# Patient Record
Sex: MALE | Race: WHITE | NOT HISPANIC OR LATINO | Employment: UNEMPLOYED | URBAN - METROPOLITAN AREA
[De-identification: names, ages, dates, MRNs, and addresses within clinical notes are randomized per-mention and may not be internally consistent; named-entity substitution may affect disease eponyms.]

---

## 2022-03-14 ENCOUNTER — HOSPITAL ENCOUNTER (INPATIENT)
Facility: CLINIC | Age: 37
LOS: 3 days | Discharge: HOME | End: 2022-03-18
Attending: EMERGENCY MEDICINE | Admitting: PSYCHIATRY & NEUROLOGY
Payer: COMMERCIAL

## 2022-03-14 ENCOUNTER — TELEPHONE (OUTPATIENT)
Dept: BEHAVIORAL HEALTH | Facility: CLINIC | Age: 37
End: 2022-03-14

## 2022-03-14 ENCOUNTER — APPOINTMENT (OUTPATIENT)
Dept: CT IMAGING | Facility: CLINIC | Age: 37
End: 2022-03-14
Attending: EMERGENCY MEDICINE
Payer: COMMERCIAL

## 2022-03-14 DIAGNOSIS — F10.10 ALCOHOL ABUSE: ICD-10-CM

## 2022-03-14 DIAGNOSIS — F31.32 BIPOLAR AFFECTIVE DISORDER, CURRENTLY DEPRESSED, MODERATE (H): ICD-10-CM
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
## 2022-03-14 DIAGNOSIS — R45.851 SUICIDAL IDEATION: ICD-10-CM

## 2022-03-14 DIAGNOSIS — S01.01XA LACERATION OF SCALP, INITIAL ENCOUNTER: ICD-10-CM

## 2022-03-14 DIAGNOSIS — G47.09 OTHER INSOMNIA: ICD-10-CM

## 2022-03-14 DIAGNOSIS — F10.120 ALCOHOL ABUSE WITH UNCOMPLICATED INTOXICATION (H): ICD-10-CM

## 2022-03-14 DIAGNOSIS — M10.9 ACUTE GOUTY ARTHRITIS: Primary | ICD-10-CM

## 2022-03-14 DIAGNOSIS — Z11.52 ENCOUNTER FOR SCREENING LABORATORY TESTING FOR SEVERE ACUTE RESPIRATORY SYNDROME CORONAVIRUS 2 (SARS-COV-2): ICD-10-CM

## 2022-03-14 LAB
ALCOHOL BREATH TEST: 0.19 (ref 0–0.01)
SARS-COV-2 RNA RESP QL NAA+PROBE: NEGATIVE

## 2022-03-14 PROCEDURE — 250N000013 HC RX MED GY IP 250 OP 250 PS 637: Performed by: EMERGENCY MEDICINE

## 2022-03-14 PROCEDURE — 70450 CT HEAD/BRAIN W/O DYE: CPT

## 2022-03-14 PROCEDURE — C9803 HOPD COVID-19 SPEC COLLECT: HCPCS | Performed by: EMERGENCY MEDICINE

## 2022-03-14 PROCEDURE — U0005 INFEC AGEN DETEC AMPLI PROBE: HCPCS | Performed by: EMERGENCY MEDICINE

## 2022-03-14 PROCEDURE — 99285 EMERGENCY DEPT VISIT HI MDM: CPT | Mod: 25 | Performed by: EMERGENCY MEDICINE

## 2022-03-14 PROCEDURE — 90791 PSYCH DIAGNOSTIC EVALUATION: CPT

## 2022-03-14 PROCEDURE — 82075 ASSAY OF BREATH ETHANOL: CPT | Performed by: EMERGENCY MEDICINE

## 2022-03-14 PROCEDURE — 99285 EMERGENCY DEPT VISIT HI MDM: CPT | Performed by: EMERGENCY MEDICINE

## 2022-03-14 RX ORDER — IBUPROFEN 600 MG/1
600 TABLET, FILM COATED ORAL ONCE
Status: COMPLETED | OUTPATIENT
Start: 2022-03-14 | End: 2022-03-14

## 2022-03-14 RX ORDER — ALLOPURINOL 100 MG/1
100 TABLET ORAL DAILY
Status: DISCONTINUED | OUTPATIENT
Start: 2022-03-14 | End: 2022-03-18

## 2022-03-14 RX ORDER — BUPROPION HYDROCHLORIDE 75 MG/1
50 TABLET ORAL 2 TIMES DAILY
COMMUNITY
End: 2022-03-14

## 2022-03-14 RX ORDER — BUPROPION HYDROCHLORIDE 150 MG/1
150 TABLET, EXTENDED RELEASE ORAL 2 TIMES DAILY
Status: DISCONTINUED | OUTPATIENT
Start: 2022-03-14 | End: 2022-03-18 | Stop reason: HOSPADM

## 2022-03-14 RX ORDER — FOLIC ACID 1 MG/1
1 TABLET ORAL DAILY
Status: DISCONTINUED | OUTPATIENT
Start: 2022-03-14 | End: 2022-03-15

## 2022-03-14 RX ORDER — BUPROPION HYDROCHLORIDE 150 MG/1
150 TABLET, EXTENDED RELEASE ORAL 2 TIMES DAILY
Status: ON HOLD | COMMUNITY
End: 2022-03-18

## 2022-03-14 RX ORDER — CLONIDINE HYDROCHLORIDE 0.1 MG/1
0.1 TABLET ORAL 2 TIMES DAILY
Status: ON HOLD | COMMUNITY
End: 2022-03-18

## 2022-03-14 RX ORDER — DIAZEPAM 5 MG
5-20 TABLET ORAL EVERY 30 MIN PRN
Status: DISCONTINUED | OUTPATIENT
Start: 2022-03-14 | End: 2022-03-15

## 2022-03-14 RX ORDER — DIVALPROEX SODIUM 500 MG/1
1500 TABLET, DELAYED RELEASE ORAL AT BEDTIME
Status: DISCONTINUED | OUTPATIENT
Start: 2022-03-14 | End: 2022-03-16

## 2022-03-14 RX ORDER — TRAZODONE HYDROCHLORIDE 50 MG/1
50 TABLET, FILM COATED ORAL AT BEDTIME
Status: ON HOLD | COMMUNITY
End: 2022-03-18

## 2022-03-14 RX ORDER — TRAZODONE HYDROCHLORIDE 50 MG/1
50 TABLET, FILM COATED ORAL AT BEDTIME
Status: DISCONTINUED | OUTPATIENT
Start: 2022-03-14 | End: 2022-03-18 | Stop reason: HOSPADM

## 2022-03-14 RX ORDER — LORAZEPAM 0.5 MG/1
0.5 TABLET ORAL 2 TIMES DAILY PRN
COMMUNITY
End: 2022-03-14

## 2022-03-14 RX ORDER — ALLOPURINOL 100 MG/1
100 TABLET ORAL DAILY
Status: ON HOLD | COMMUNITY
End: 2022-03-18

## 2022-03-14 RX ORDER — LORAZEPAM 0.5 MG/1
0.5 TABLET ORAL DAILY PRN
COMMUNITY

## 2022-03-14 RX ORDER — IBUPROFEN 600 MG/1
600 TABLET, FILM COATED ORAL EVERY 6 HOURS PRN
Status: DISCONTINUED | OUTPATIENT
Start: 2022-03-14 | End: 2022-03-18 | Stop reason: HOSPADM

## 2022-03-14 RX ORDER — CLONIDINE HYDROCHLORIDE 0.1 MG/1
0.1 TABLET ORAL 2 TIMES DAILY
Status: DISCONTINUED | OUTPATIENT
Start: 2022-03-14 | End: 2022-03-18 | Stop reason: HOSPADM

## 2022-03-14 RX ORDER — ACETAMINOPHEN 325 MG/1
650 TABLET ORAL EVERY 4 HOURS PRN
Status: DISCONTINUED | OUTPATIENT
Start: 2022-03-14 | End: 2022-03-15

## 2022-03-14 RX ORDER — DIVALPROEX SODIUM 500 MG/1
1500 TABLET, DELAYED RELEASE ORAL AT BEDTIME
Status: ON HOLD | COMMUNITY
End: 2022-03-18

## 2022-03-14 RX ORDER — MULTIPLE VITAMINS W/ MINERALS TAB 9MG-400MCG
1 TAB ORAL DAILY
Status: DISCONTINUED | OUTPATIENT
Start: 2022-03-14 | End: 2022-03-15

## 2022-03-14 RX ADMIN — DIAZEPAM 10 MG: 5 TABLET ORAL at 13:33

## 2022-03-14 RX ADMIN — CLONIDINE HYDROCHLORIDE 0.1 MG: 0.1 TABLET ORAL at 19:53

## 2022-03-14 RX ADMIN — DIAZEPAM 10 MG: 5 TABLET ORAL at 19:03

## 2022-03-14 RX ADMIN — BUPROPION HYDROCHLORIDE 150 MG: 150 TABLET, EXTENDED RELEASE ORAL at 20:20

## 2022-03-14 RX ADMIN — THIAMINE HCL TAB 100 MG 100 MG: 100 TAB at 13:33

## 2022-03-14 RX ADMIN — DIAZEPAM 5 MG: 5 TABLET ORAL at 21:03

## 2022-03-14 RX ADMIN — IBUPROFEN 600 MG: 600 TABLET ORAL at 08:17

## 2022-03-14 RX ADMIN — MULTIPLE VITAMINS W/ MINERALS TAB 1 TABLET: TAB at 13:34

## 2022-03-14 RX ADMIN — DIVALPROEX SODIUM 1500 MG: 500 TABLET, DELAYED RELEASE ORAL at 22:00

## 2022-03-14 RX ADMIN — ALLOPURINOL 100 MG: 100 TABLET ORAL at 18:51

## 2022-03-14 RX ADMIN — FOLIC ACID 1 MG: 1 TABLET ORAL at 13:34

## 2022-03-14 RX ADMIN — TRAZODONE HYDROCHLORIDE 50 MG: 50 TABLET ORAL at 22:00

## 2022-03-14 RX ADMIN — ACETAMINOPHEN 650 MG: 325 TABLET, FILM COATED ORAL at 19:52

## 2022-03-14 ASSESSMENT — ENCOUNTER SYMPTOMS
DIFFICULTY URINATING: 0
BACK PAIN: 0
SHORTNESS OF BREATH: 0
FEVER: 0
ABDOMINAL PAIN: 0
NAUSEA: 0
CHILLS: 0
HALLUCINATIONS: 0
HEADACHES: 0
NECK PAIN: 0
VOMITING: 0
NERVOUS/ANXIOUS: 0
DYSPHORIC MOOD: 1
SLEEP DISTURBANCE: 1
HYPERACTIVE: 0
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
## 2022-03-14 NOTE — PHARMACY-ADMISSION MEDICATION HISTORY
Admission Medication History status for the 3/14/2022 admission is complete.  See EPIC admission navigator for Prior to Admission medications.    Medication history sources:  Patient and Catarina dwyer in [place] at [phone] (spoke with KATIE Michaels), care everywhere.     Medication history source reliability: Good    Medication adherence:  Moderate    Changes made to PTA medication list (reason)  Added: n/a  Deleted: n/a  Changed:   1. Bupropion ( Wellbutrin) 75 mg po bid changed to 150 mg SR po bid   2.  Lorazepam 0.5 mg po bid prn changed to 0.5 mg po daily prn     Additional medication history information (including reliability of information, actions taken by pharmacist): pt reports that he takes lorazepam 0.5mg po bid prn  instated of 0.5mg daily prn.  Pt was prescribed clonidine 0.05 mg po bid for 14 days in jan of 2022. Pt took 0.1 mg po bid instead. He  stated that he  run out of his clonidine  and unable to get refilled.     Time spent in this activity: 20 minutes     Medication history completed by: Ashanti Enrique Pharm.D     Prior to Admission medications    Medication Sig Last Dose Taking? Auth Provider   allopurinol (ZYLOPRIM) 100 MG tablet Take 100 mg by mouth daily Past Week at Unknown time Yes Reported, Patient   buPROPion (WELLBUTRIN SR) 150 MG 12 hr tablet Take 150 mg by mouth 2 times daily 3/12/2022 Yes Unknown, Entered By History   cloNIDine (CATAPRES) 0.1 MG tablet Take 0.1 mg by mouth 2 times daily Past Week at Unknown time Yes Reported, Patient   divalproex sodium delayed-release (DEPAKOTE) 500 MG DR tablet Take 1,500 mg by mouth At Bedtime 3/12/2022 at Unknown time Yes Reported, Patient   LORazepam (ATIVAN) 0.5 MG tablet Take 0.5 mg by mouth daily as needed for anxiety Pt take it 0.5 mg po bid  Yes Unknown, Entered By History   traZODone (DESYREL) 50 MG tablet Take 50 mg by mouth At Bedtime 3/13/2022 at Unknown time Yes Reported, Patient

## 2022-03-14 NOTE — ED NOTES
Bed: HW01  Expected date: 3/14/22  Expected time: 5:11 AM  Means of arrival: Ambulance  Comments:  [id number]  36M  SI
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
## 2022-03-15 PROBLEM — F32.A DEPRESSION: Status: ACTIVE | Noted: 2022-03-15

## 2022-03-15 LAB
AMPHETAMINES UR QL SCN: ABNORMAL
BARBITURATES UR QL: ABNORMAL
BENZODIAZ UR QL: ABNORMAL
CANNABINOIDS UR QL SCN: ABNORMAL
COCAINE UR QL: ABNORMAL
OPIATES UR QL SCN: ABNORMAL

## 2022-03-15 PROCEDURE — 250N000013 HC RX MED GY IP 250 OP 250 PS 637: Performed by: EMERGENCY MEDICINE

## 2022-03-15 PROCEDURE — 124N000002 HC R&B MH UMMC

## 2022-03-15 PROCEDURE — 250N000013 HC RX MED GY IP 250 OP 250 PS 637: Performed by: PSYCHIATRY & NEUROLOGY

## 2022-03-15 PROCEDURE — 80307 DRUG TEST PRSMV CHEM ANLYZR: CPT | Performed by: EMERGENCY MEDICINE

## 2022-03-15 RX ORDER — NICOTINE 21 MG/24HR
1 PATCH, TRANSDERMAL 24 HOURS TRANSDERMAL DAILY
Status: DISCONTINUED | OUTPATIENT
Start: 2022-03-15 | End: 2022-03-18 | Stop reason: HOSPADM

## 2022-03-15 RX ORDER — LORAZEPAM 0.5 MG/1
0.5 TABLET ORAL DAILY PRN
Status: DISCONTINUED | OUTPATIENT
Start: 2022-03-15 | End: 2022-03-18 | Stop reason: HOSPADM

## 2022-03-15 RX ORDER — FOLIC ACID 1 MG/1
1 TABLET ORAL DAILY
Status: DISCONTINUED | OUTPATIENT
Start: 2022-03-16 | End: 2022-03-18 | Stop reason: HOSPADM

## 2022-03-15 RX ORDER — MAGNESIUM HYDROXIDE/ALUMINUM HYDROXICE/SIMETHICONE 120; 1200; 1200 MG/30ML; MG/30ML; MG/30ML
30 SUSPENSION ORAL EVERY 4 HOURS PRN
Status: DISCONTINUED | OUTPATIENT
Start: 2022-03-15 | End: 2022-03-18 | Stop reason: HOSPADM

## 2022-03-15 RX ORDER — HYDROXYZINE HYDROCHLORIDE 25 MG/1
25 TABLET, FILM COATED ORAL EVERY 4 HOURS PRN
Status: DISCONTINUED | OUTPATIENT
Start: 2022-03-15 | End: 2022-03-18 | Stop reason: HOSPADM

## 2022-03-15 RX ORDER — OLANZAPINE 10 MG/2ML
10 INJECTION, POWDER, FOR SOLUTION INTRAMUSCULAR 3 TIMES DAILY PRN
Status: DISCONTINUED | OUTPATIENT
Start: 2022-03-15 | End: 2022-03-18 | Stop reason: HOSPADM

## 2022-03-15 RX ORDER — LORAZEPAM 1 MG/1
1-4 TABLET ORAL EVERY 30 MIN PRN
Status: DISCONTINUED | OUTPATIENT
Start: 2022-03-15 | End: 2022-03-17

## 2022-03-15 RX ORDER — MULTIPLE VITAMINS W/ MINERALS TAB 9MG-400MCG
1 TAB ORAL DAILY
Status: DISCONTINUED | OUTPATIENT
Start: 2022-03-16 | End: 2022-03-18 | Stop reason: HOSPADM

## 2022-03-15 RX ORDER — OLANZAPINE 10 MG/1
10 TABLET ORAL 3 TIMES DAILY PRN
Status: DISCONTINUED | OUTPATIENT
Start: 2022-03-15 | End: 2022-03-18 | Stop reason: HOSPADM

## 2022-03-15 RX ORDER — ATENOLOL 25 MG/1
50 TABLET ORAL DAILY PRN
Status: DISCONTINUED | OUTPATIENT
Start: 2022-03-15 | End: 2022-03-18 | Stop reason: HOSPADM

## 2022-03-15 RX ORDER — ACETAMINOPHEN 325 MG/1
650 TABLET ORAL EVERY 4 HOURS PRN
Status: DISCONTINUED | OUTPATIENT
Start: 2022-03-15 | End: 2022-03-18 | Stop reason: HOSPADM

## 2022-03-15 RX ORDER — AMOXICILLIN 250 MG
1 CAPSULE ORAL 2 TIMES DAILY PRN
Status: DISCONTINUED | OUTPATIENT
Start: 2022-03-15 | End: 2022-03-18 | Stop reason: HOSPADM

## 2022-03-15 RX ADMIN — DIVALPROEX SODIUM 1500 MG: 500 TABLET, DELAYED RELEASE ORAL at 20:38

## 2022-03-15 RX ADMIN — DIAZEPAM 5 MG: 5 TABLET ORAL at 01:46

## 2022-03-15 RX ADMIN — THIAMINE HCL TAB 100 MG 100 MG: 100 TAB at 09:41

## 2022-03-15 RX ADMIN — FOLIC ACID 1 MG: 1 TABLET ORAL at 09:42

## 2022-03-15 RX ADMIN — ALLOPURINOL 100 MG: 100 TABLET ORAL at 09:54

## 2022-03-15 RX ADMIN — MULTIPLE VITAMINS W/ MINERALS TAB 1 TABLET: TAB at 09:41

## 2022-03-15 RX ADMIN — CLONIDINE HYDROCHLORIDE 0.1 MG: 0.1 TABLET ORAL at 09:41

## 2022-03-15 RX ADMIN — LORAZEPAM 0.5 MG: 0.5 TABLET ORAL at 20:39

## 2022-03-15 RX ADMIN — BUPROPION HYDROCHLORIDE 150 MG: 150 TABLET, EXTENDED RELEASE ORAL at 09:53

## 2022-03-15 RX ADMIN — TRAZODONE HYDROCHLORIDE 50 MG: 50 TABLET ORAL at 20:39

## 2022-03-15 RX ADMIN — DIAZEPAM 5 MG: 5 TABLET ORAL at 06:54

## 2022-03-15 RX ADMIN — CLONIDINE HYDROCHLORIDE 0.1 MG: 0.1 TABLET ORAL at 20:39

## 2022-03-15 ASSESSMENT — ACTIVITIES OF DAILY LIVING (ADL)
HYGIENE/GROOMING: INDEPENDENT
LAUNDRY: WITH SUPERVISION
DRESS: INDEPENDENT
ORAL_HYGIENE: INDEPENDENT

## 2022-03-15 ASSESSMENT — PATIENT HEALTH QUESTIONNAIRE - PHQ9: SUM OF ALL RESPONSES TO PHQ9 QUESTIONS 1 & 2: 6
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
## 2022-03-15 NOTE — PLAN OF CARE
03/15/22 1842   Patient Belongings   Did you bring any home meds/supplements to the hospital?  Yes   Disposition of meds    (given to RN)   Patient Belongings remains with patient;locker;sent to security per site process   Patient Belongings Remaining with Patient other (see comments)   Patient Belongings Put in Hospital Secure Location (Security or Locker, etc.) other (see comments)   Belongings Search Yes   Clothing Search Yes   Second Staff Solomon REY     On Person:  Underwear    Locker #6:  Citizen watch, smartphone, collared shirt, jeans, shoes, grey jacket, grey duffel bag (phone cord, breathalyzer, socks, boxers, toiletries, blue jacket, misc papers), lighter, cigaretttes-7, wallet (MNID, insurance cards, misc), galaxy smart watch  Security Envelope #[id number]:  Cash $1,115 (15x50's, 13x20's, 5x1's)  Mastercard -7 ([id number], [id number], [phone], 4555, [phone],  [phone])  [employer] Card (4060)  [employer] Card (4634)  C[employer] Card (7492)  Discover Card (1047)  Visa-3 (1088, [phone],5624)      ..A               Admission:  I am responsible for any personal items that are not sent to the safe or pharmacy.  [facility] is not responsible for loss, theft or damage of any property in my possession.    Signature:  _________________________________ Date: _______  Time: _____                                              Staff Signature:  ____________________________ Date: ________  Time: _____      2nd Staff person, if patient is unable/unwilling to sign:    Signature: ________________________________ Date: ________  Time: _____     Discharge:  [facility] has returned all of my personal belongings:    Signature: _________________________________ Date: ________  Time: _____                                          Staff Signature:  ____________________________ Date: ________  Time: _____
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
## 2022-03-15 NOTE — TELEPHONE ENCOUNTER
0340 Bed Search Update:    [facility] only.  No appropriate beds tonight.  Remains on wait list pending bed availability.

## 2022-03-16 LAB
ALBUMIN SERPL-MCNC: 3.8 G/DL (ref 3.4–5)
ALP SERPL-CCNC: 90 U/L (ref 40–150)
ALT SERPL W P-5'-P-CCNC: 26 U/L (ref 0–70)
ANION GAP SERPL CALCULATED.3IONS-SCNC: 5 MMOL/L (ref 3–14)
AST SERPL W P-5'-P-CCNC: 15 U/L (ref 0–45)
BASOPHILS # BLD AUTO: 0.1 10E3/UL (ref 0–0.2)
BASOPHILS NFR BLD AUTO: 1 %
BILIRUB SERPL-MCNC: 0.7 MG/DL (ref 0.2–1.3)
BUN SERPL-MCNC: 10 MG/DL (ref 7–30)
CALCIUM SERPL-MCNC: 9.5 MG/DL (ref 8.5–10.1)
CHLORIDE BLD-SCNC: 105 MMOL/L (ref 94–109)
CHOLEST SERPL-MCNC: 172 MG/DL
CO2 SERPL-SCNC: 29 MMOL/L (ref 20–32)
CREAT SERPL-MCNC: 1.12 MG/DL (ref 0.66–1.25)
EOSINOPHIL # BLD AUTO: 0.2 10E3/UL (ref 0–0.7)
EOSINOPHIL NFR BLD AUTO: 2 %
ERYTHROCYTE [DISTWIDTH] IN BLOOD BY AUTOMATED COUNT: 12.6 % (ref 10–15)
GFR SERPL CREATININE-BSD FRML MDRD: 88 ML/MIN/1.73M2
GGT SERPL-CCNC: 33 U/L (ref 0–75)
GLUCOSE BLD-MCNC: 99 MG/DL (ref 70–99)
HCT VFR BLD AUTO: 44 % (ref 40–53)
HDLC SERPL-MCNC: 33 MG/DL
HGB BLD-MCNC: 15.1 G/DL (ref 13.3–17.7)
IMM GRANULOCYTES # BLD: 0 10E3/UL
IMM GRANULOCYTES NFR BLD: 0 %
LDLC SERPL CALC-MCNC: 86 MG/DL
LYMPHOCYTES # BLD AUTO: 2.9 10E3/UL (ref 0.8–5.3)
LYMPHOCYTES NFR BLD AUTO: 38 %
MCH RBC QN AUTO: 29.5 PG (ref 26.5–33)
MCHC RBC AUTO-ENTMCNC: 34.3 G/DL (ref 31.5–36.5)
MCV RBC AUTO: 86 FL (ref 78–100)
MONOCYTES # BLD AUTO: 0.8 10E3/UL (ref 0–1.3)
MONOCYTES NFR BLD AUTO: 11 %
NEUTROPHILS # BLD AUTO: 3.7 10E3/UL (ref 1.6–8.3)
NEUTROPHILS NFR BLD AUTO: 48 %
NONHDLC SERPL-MCNC: 139 MG/DL
NRBC # BLD AUTO: 0 10E3/UL
NRBC BLD AUTO-RTO: 0 /100
PLATELET # BLD AUTO: 237 10E3/UL (ref 150–450)
POTASSIUM BLD-SCNC: 5.2 MMOL/L (ref 3.4–5.3)
PROT SERPL-MCNC: 7 G/DL (ref 6.8–8.8)
RBC # BLD AUTO: 5.11 10E6/UL (ref 4.4–5.9)
SODIUM SERPL-SCNC: 139 MMOL/L (ref 133–144)
TRIGL SERPL-MCNC: 266 MG/DL
TSH SERPL DL<=0.005 MIU/L-ACNC: 1.22 MU/L (ref 0.4–4)
VALPROATE SERPL-MCNC: 118 MG/L
WBC # BLD AUTO: 7.7 10E3/UL (ref 4–11)

## 2022-03-16 PROCEDURE — 82040 ASSAY OF SERUM ALBUMIN: CPT | Performed by: PSYCHIATRY & NEUROLOGY

## 2022-03-16 PROCEDURE — 80053 COMPREHEN METABOLIC PANEL: CPT | Performed by: PSYCHIATRY & NEUROLOGY

## 2022-03-16 PROCEDURE — G0177 OPPS/PHP; TRAIN & EDUC SERV: HCPCS

## 2022-03-16 PROCEDURE — 124N000002 HC R&B MH UMMC

## 2022-03-16 PROCEDURE — 80164 ASSAY DIPROPYLACETIC ACD TOT: CPT | Performed by: PSYCHIATRY & NEUROLOGY

## 2022-03-16 PROCEDURE — 84443 ASSAY THYROID STIM HORMONE: CPT | Performed by: PSYCHIATRY & NEUROLOGY

## 2022-03-16 PROCEDURE — 80061 LIPID PANEL: CPT | Performed by: PSYCHIATRY & NEUROLOGY

## 2022-03-16 PROCEDURE — 99222 1ST HOSP IP/OBS MODERATE 55: CPT | Mod: AI | Performed by: PSYCHIATRY & NEUROLOGY

## 2022-03-16 PROCEDURE — 36415 COLL VENOUS BLD VENIPUNCTURE: CPT | Performed by: PSYCHIATRY & NEUROLOGY

## 2022-03-16 PROCEDURE — 250N000013 HC RX MED GY IP 250 OP 250 PS 637: Performed by: PSYCHIATRY & NEUROLOGY

## 2022-03-16 PROCEDURE — 250N000013 HC RX MED GY IP 250 OP 250 PS 637: Performed by: EMERGENCY MEDICINE

## 2022-03-16 PROCEDURE — 85025 COMPLETE CBC W/AUTO DIFF WBC: CPT | Performed by: PSYCHIATRY & NEUROLOGY

## 2022-03-16 PROCEDURE — 82977 ASSAY OF GGT: CPT | Performed by: PSYCHIATRY & NEUROLOGY

## 2022-03-16 PROCEDURE — 90853 GROUP PSYCHOTHERAPY: CPT

## 2022-03-16 RX ORDER — ARIPIPRAZOLE 2 MG/1
2 TABLET ORAL DAILY
Status: DISCONTINUED | OUTPATIENT
Start: 2022-03-16 | End: 2022-03-17

## 2022-03-16 RX ADMIN — FOLIC ACID 1 MG: 1 TABLET ORAL at 08:30

## 2022-03-16 RX ADMIN — Medication 25 MG: at 13:15

## 2022-03-16 RX ADMIN — MULTIPLE VITAMINS W/ MINERALS TAB 1 TABLET: TAB at 08:30

## 2022-03-16 RX ADMIN — NICOTINE 1 PATCH: 21 PATCH, EXTENDED RELEASE TRANSDERMAL at 08:38

## 2022-03-16 RX ADMIN — CLONIDINE HYDROCHLORIDE 0.1 MG: 0.1 TABLET ORAL at 08:31

## 2022-03-16 RX ADMIN — ARIPIPRAZOLE 2 MG: 2 TABLET ORAL at 13:15

## 2022-03-16 RX ADMIN — CLONIDINE HYDROCHLORIDE 0.1 MG: 0.1 TABLET ORAL at 19:32

## 2022-03-16 RX ADMIN — BUPROPION HYDROCHLORIDE 150 MG: 150 TABLET, EXTENDED RELEASE ORAL at 08:38

## 2022-03-16 RX ADMIN — LORAZEPAM 0.5 MG: 0.5 TABLET ORAL at 08:38

## 2022-03-16 RX ADMIN — THIAMINE HCL TAB 100 MG 100 MG: 100 TAB at 08:31

## 2022-03-16 RX ADMIN — TRAZODONE HYDROCHLORIDE 50 MG: 50 TABLET ORAL at 22:29

## 2022-03-16 RX ADMIN — DIVALPROEX SODIUM 1250 MG: 500 TABLET, DELAYED RELEASE ORAL at 22:30

## 2022-03-16 RX ADMIN — ALLOPURINOL 100 MG: 100 TABLET ORAL at 08:31

## 2022-03-16 RX ADMIN — BUPROPION HYDROCHLORIDE 150 MG: 150 TABLET, EXTENDED RELEASE ORAL at 19:32

## 2022-03-16 ASSESSMENT — ACTIVITIES OF DAILY LIVING (ADL)
ORAL_HYGIENE: INDEPENDENT
LAUNDRY: WITH SUPERVISION
HYGIENE/GROOMING: INDEPENDENT
DRESS: INDEPENDENT

## 2022-03-16 NOTE — PLAN OF CARE
Patient slept approximately 6.75 hours during the overnight shift. Patient was agreeable to MSSA assessment at 0615, at which time he scored a 1. Patient currently in bed, appears to be comfortably asleep. Nursing will continue to monitor.

## 2022-03-16 NOTE — PLAN OF CARE
Goal Outcome Evaluation:    Problem: Depression  Goal: Improved Mood  Outcome: Ongoing, Progressing     Patient has been visible in the milieu. He has been calm and cooperative. AOx4. He attended groups.     Patient reported tossing and turning at night.     Good appetite. Patient reported not feeling full after meals; double portion meals was ordered.    Patient endorsed depression 8/10 and anxiety 7/10. Denied SI/SIB.    MSSA=7 and 7. Patient endorsed having visual hallucinations on shadows and minimal tremors noted. VSS.    We'll continue to monitor.
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
## 2022-03-16 NOTE — PLAN OF CARE
Received patient in bed sleeping. Writer attempted to complete MSSA at 2330 and again at 0130, but patient declined. Patient's breathing is even and unlabored and appears to be sleeping comfortably. Writer will continue to make attempts to complete MSSA through the shift. Will continue to monitor.
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
## 2022-03-16 NOTE — PLAN OF CARE
03/16/22 1522   Group Therapy Session   Group Attendance attended group session   Time Session Began 1405   Time Session Ended 1500   Total Time patient participated (minutes) 55   Total # Attendees 3   Group Type life skill;other (see comments)  (OT)   Group Topic Covered coping skills/lifestyle management;other (see comments)  (sensory)   Group Session Detail  Focus of group was basic introduction of using both internal and external senses for calming and alerting self for mental health management.    Patient Response/Contribution able to recall/repeat info presented;cooperative with task;discussed personal experience with topic;expressed understanding of topic;listened actively;expressed readiness to alter behaviors   Patient Response Detail Pt was able to identify a few specific types of techniques with several of the various senses. He did note having several techniques at home to use and fairly familiar with techniques. Open to explore how to utilize these more regularly       Goal Outcome Evaluation: Ongoing and progressing
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
## 2022-03-16 NOTE — PROGRESS NOTES
SPIRITUAL HEALTH SERVICES  SPIRITUAL ASSESSMENT Progress Note  [facility]     REFERRAL SOURCE: I did visit this morning patient Edward per [facility] hospital  visit. I introduced myself as the unit  and shared all the info about the SHS. I also invited him to come into my spirituality group activity and he came to attend. Pt was active participant and shared his thought openly to the group.     PLAN: I will remain open to provide spiritual care for the pt as needed.    Barak Bunch M.Div. ([place]), M.Th., D.Min., [facility]  Staff   Pager [phone]

## 2022-03-16 NOTE — PLAN OF CARE
[facility]  Occupational Therapy Behavioral Health Assessment    Patient Name: Zbigniew Arango    Description: OT staff met with pt to review the role of occupational therapy and explain the value of having them involved in their treatment plan including options to meet current needs/self-identified goals. The below evaluation is a compilation of functional performance observation and information obtained from an OT self-assessment.     Clinical impression through direct observation: Pt was late for first OT group as he was seeing the provider. He came to group for last 5 minutes and completed this self assessment. No charge for session. No engagement in any individual activity.     Patient indicated success in: (Bolded items indicate activities the pt selected)   Time spent with family or friends  Relaxing and enjoying myself  Having a satisfying routine  Work or volunteering   Concentrating on my tasks     Living independently  Taking care of the place where I live  Transportation  Managing my finances  Managing my basic needs (food, medicine, sleep)  Learning new things  Ability to pursue my goals  Other:    Patient indicated barriers to success are: (Bolded items indicate activities the pt selected)   Difficulty concentrating  Memory problems  Physical health/Chronic Pain  Lacks support (emotional/physical/spiritual)  Motivation/mood  Finances  Using drugs or alcohol  Sleeping too much or not enough  Missing work/appointments  Bothered by lights or sounds in the environment  Bothered by touch, texture, or movement  Lack of satisfying daily routine   Living environment  Transportation  Other:          Patient indicated these supports: (Bolded items indicate activities the pt selected)   Safe place to live  Family, friends or caregivers to help out when needed  A best friend or significant other  Pets  Belief in myself and abilities  Routines and rituals that support my wellness  Access to email,  "social media, phone calls  Leisure supplies to support my interests and hobbies  Professionals: , Therapist, etc.  Other:      Patient identified these emotional, physical or mental health concerns: \"Depression, kelly, psychosis, impulsivity, delusions, anger, lack of self control\"    Patient jennifer with these concerns: \"nature, food, friends, music, doing nice things for others\"    Patient enjoys spending time with: \"Maggie (3 year old daughter), friends\"    Identified enjoyment in activities: \"music, concerts, food, restaurants, biking, cooking, hiking, dancing\"    Stressors or changes in the past year: \"kicked out of home, promotion, legal trouble, lost daughter, ended 11 year relationship, suicide attempts\"    Patient identified values: \"honesty\"    Patient's goal for the future is \"stability, inner peace\"    Goals selected by patient to work on with OT: (Bolded items indicate activities the pt selected)   Have hope for the future  Feel better  Learn ways to stay well and avoid coming to the hospital  Share my thoughts and feelings  Feel more confident  Improve my sleep  Improve my concentration  Relax and enjoy myself  Improve my relationships with others  Handle my frustrations  Develop a satisfying daily routine  Manage my physical pain  Explore use of sensory coping strategies  Other:     Assessment: Patient would benefit from continue engagement in OT groups that support healthy recovery, specifically exploration of positive coping skills for symptom management/relapse prevention.    Plan: Initiate care plan goals and interventions.    Patient participated in goal(s) selection and understands the plan of care: Yes    IP OT Goal: Work with pt to increase awareness of how symptoms can impact performance on goal-directed tasks and life management skills. Will provide opportunities to build on coping strategies to manage symptoms during hospitalization and after discharge.      Plan for Next " Treatment: Provide graded occupation-based activities for increased success and ongoing assessment.     JACIEL Oleary/L on 3/16/2022 at 1:17 PM
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
## 2022-03-16 NOTE — PLAN OF CARE
03/16/22 1418   Group Therapy Session   Group Attendance attended group session   Time Session Began 1310   Time Session Ended 1400   Total Time patient participated (minutes) 50   Total # Attendees 3   Group Type psychotherapeutic   Group Topic Covered emotions/expression;self-care activities;coping skills/lifestyle management   Group Session Detail Positive Self Talk   Patient Response/Contribution able to recall/repeat info presented;discussed personal experience with topic;expressed understanding of topic;offered helpful suggestions to peers   Patient Response Detail Pt was socially appropriate participant and shared personal examples related to the topic. Pt identified ways to improve positive self talk, including focusing more on himself versus his relationship with the mother of his child.

## 2022-03-17 PROCEDURE — 250N000013 HC RX MED GY IP 250 OP 250 PS 637: Performed by: PSYCHIATRY & NEUROLOGY

## 2022-03-17 PROCEDURE — 250N000013 HC RX MED GY IP 250 OP 250 PS 637: Performed by: EMERGENCY MEDICINE

## 2022-03-17 PROCEDURE — 124N000002 HC R&B MH UMMC

## 2022-03-17 PROCEDURE — 99232 SBSQ HOSP IP/OBS MODERATE 35: CPT | Performed by: PSYCHIATRY & NEUROLOGY

## 2022-03-17 RX ORDER — ARIPIPRAZOLE 2 MG/1
2 TABLET ORAL AT BEDTIME
Status: DISCONTINUED | OUTPATIENT
Start: 2022-03-18 | End: 2022-03-18 | Stop reason: HOSPADM

## 2022-03-17 RX ADMIN — ALLOPURINOL 100 MG: 100 TABLET ORAL at 08:25

## 2022-03-17 RX ADMIN — BUPROPION HYDROCHLORIDE 150 MG: 150 TABLET, EXTENDED RELEASE ORAL at 21:45

## 2022-03-17 RX ADMIN — CLONIDINE HYDROCHLORIDE 0.1 MG: 0.1 TABLET ORAL at 08:24

## 2022-03-17 RX ADMIN — LORAZEPAM 0.5 MG: 0.5 TABLET ORAL at 08:23

## 2022-03-17 RX ADMIN — CLONIDINE HYDROCHLORIDE 0.1 MG: 0.1 TABLET ORAL at 21:45

## 2022-03-17 RX ADMIN — BUPROPION HYDROCHLORIDE 150 MG: 150 TABLET, EXTENDED RELEASE ORAL at 08:23

## 2022-03-17 RX ADMIN — ARIPIPRAZOLE 2 MG: 2 TABLET ORAL at 08:23

## 2022-03-17 RX ADMIN — Medication 25 MG: at 08:23

## 2022-03-17 RX ADMIN — FOLIC ACID 1 MG: 1 TABLET ORAL at 08:25

## 2022-03-17 RX ADMIN — TRAZODONE HYDROCHLORIDE 50 MG: 50 TABLET ORAL at 21:46

## 2022-03-17 RX ADMIN — DIVALPROEX SODIUM 1250 MG: 500 TABLET, DELAYED RELEASE ORAL at 21:45

## 2022-03-17 RX ADMIN — THIAMINE HCL TAB 100 MG 100 MG: 100 TAB at 08:25

## 2022-03-17 RX ADMIN — IBUPROFEN 600 MG: 600 TABLET ORAL at 08:24

## 2022-03-17 RX ADMIN — IBUPROFEN 600 MG: 600 TABLET ORAL at 22:02

## 2022-03-17 RX ADMIN — MULTIPLE VITAMINS W/ MINERALS TAB 1 TABLET: TAB at 08:24

## 2022-03-17 RX ADMIN — ALUMINUM HYDROXIDE, MAGNESIUM HYDROXIDE, AND SIMETHICONE 30 ML: 200; 200; 20 SUSPENSION ORAL at 07:12

## 2022-03-17 ASSESSMENT — ACTIVITIES OF DAILY LIVING (ADL)
ORAL_HYGIENE: INDEPENDENT
DRESS: INDEPENDENT
ORAL_HYGIENE: INDEPENDENT
HYGIENE/GROOMING: INDEPENDENT
LAUNDRY: WITH SUPERVISION
HYGIENE/GROOMING: INDEPENDENT
DRESS: INDEPENDENT

## 2022-03-17 NOTE — PLAN OF CARE
Problem: Plan of Care - These are the overarching goals to be used throughout the patient stay.    Goal: Optimal Comfort and Wellbeing  Outcome: Ongoing, Progressing    Patient was in bed at the beginning of shift breathing quiet and unlabored.patient scored a MSSA of 7. No complain of pain noted or observed, and no medical concerns at this time. Will continue to monitor.
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
## 2022-03-17 NOTE — PLAN OF CARE
"  Problem: Behavioral Health Plan of Care  Goal: Optimal Comfort and Wellbeing  Outcome: Ongoing, Not Progressing     Problem: Depression  Goal: Improved Mood  Outcome: Ongoing, Not Progressing   Goal Outcome Evaluation:        The patient was not visible in the milieu, was in his room most of the shift, appeared sleeping, did not participated in a group activity. Reported anxiety 5/10, depression 5/10, MSSA was 6 and 5, denies withdrawal symptoms. The writer offered PRN medication pt stated: \" no, I will be fine I do not need medication. \" I feel very tired, I think is the Abilify what is making me so tired\". Appears in a good mood, calm, and cooperative with all cares. Denies SI/HI, or hallucination, eat 100%% of his dinner and snack. Good personal hygiene, medication compliant denies side effects, no behavioral issues noted, currently, pt is in her relaxing, will continue to monitor.              "

## 2022-03-18 VITALS
BODY MASS INDEX: 29.97 KG/M2 | DIASTOLIC BLOOD PRESSURE: 78 MMHG | HEART RATE: 84 BPM | RESPIRATION RATE: 16 BRPM | OXYGEN SATURATION: 95 % | HEIGHT: 74 IN | WEIGHT: 233.5 LBS | TEMPERATURE: 98.3 F | SYSTOLIC BLOOD PRESSURE: 114 MMHG

## 2022-03-18 PROCEDURE — 99207 PR CONSULT E&M CHANGED TO INITIAL LEVEL: CPT | Performed by: PHYSICIAN ASSISTANT

## 2022-03-18 PROCEDURE — 99239 HOSP IP/OBS DSCHRG MGMT >30: CPT | Performed by: PSYCHIATRY & NEUROLOGY

## 2022-03-18 PROCEDURE — H0001 ALCOHOL AND/OR DRUG ASSESS: HCPCS

## 2022-03-18 PROCEDURE — 250N000013 HC RX MED GY IP 250 OP 250 PS 637: Performed by: PHYSICIAN ASSISTANT

## 2022-03-18 PROCEDURE — 250N000013 HC RX MED GY IP 250 OP 250 PS 637: Performed by: PSYCHIATRY & NEUROLOGY

## 2022-03-18 PROCEDURE — 250N000013 HC RX MED GY IP 250 OP 250 PS 637: Performed by: EMERGENCY MEDICINE

## 2022-03-18 PROCEDURE — 99221 1ST HOSP IP/OBS SF/LOW 40: CPT | Performed by: PHYSICIAN ASSISTANT

## 2022-03-18 RX ORDER — BUPROPION HYDROCHLORIDE 150 MG/1
150 TABLET, EXTENDED RELEASE ORAL 2 TIMES DAILY
Qty: 60 TABLET | Refills: 0 | Status: SHIPPED | OUTPATIENT
Start: 2022-03-18

## 2022-03-18 RX ORDER — INDOMETHACIN 25 MG/1
25 CAPSULE ORAL 3 TIMES DAILY PRN
Qty: 90 CAPSULE | Refills: 0 | Status: SHIPPED | OUTPATIENT
Start: 2022-03-18

## 2022-03-18 RX ORDER — NALTREXONE HYDROCHLORIDE 50 MG/1
TABLET, FILM COATED ORAL
Qty: 15 TABLET | Refills: 0 | Status: SHIPPED | OUTPATIENT
Start: 2022-03-18

## 2022-03-18 RX ORDER — DIVALPROEX SODIUM 250 MG/1
1250 TABLET, DELAYED RELEASE ORAL AT BEDTIME
Qty: 150 TABLET | Refills: 0 | Status: SHIPPED | OUTPATIENT
Start: 2022-03-18

## 2022-03-18 RX ORDER — CLONIDINE HYDROCHLORIDE 0.1 MG/1
0.1 TABLET ORAL 2 TIMES DAILY
Qty: 60 TABLET | Refills: 0 | Status: SHIPPED | OUTPATIENT
Start: 2022-03-18

## 2022-03-18 RX ORDER — ARIPIPRAZOLE 2 MG/1
2 TABLET ORAL AT BEDTIME
Qty: 30 TABLET | Refills: 0 | Status: SHIPPED | OUTPATIENT
Start: 2022-03-18

## 2022-03-18 RX ORDER — ALLOPURINOL 300 MG/1
300 TABLET ORAL DAILY
Status: DISCONTINUED | OUTPATIENT
Start: 2022-03-19 | End: 2022-03-18 | Stop reason: HOSPADM

## 2022-03-18 RX ORDER — INDOMETHACIN 25 MG/1
25 CAPSULE ORAL 3 TIMES DAILY PRN
Status: DISCONTINUED | OUTPATIENT
Start: 2022-03-18 | End: 2022-03-18 | Stop reason: HOSPADM

## 2022-03-18 RX ORDER — ALLOPURINOL 300 MG/1
300 TABLET ORAL DAILY
Qty: 30 TABLET | Refills: 0 | Status: SHIPPED | OUTPATIENT
Start: 2022-03-19

## 2022-03-18 RX ORDER — ALLOPURINOL 100 MG/1
200 TABLET ORAL ONCE
Status: COMPLETED | OUTPATIENT
Start: 2022-03-18 | End: 2022-03-18

## 2022-03-18 RX ORDER — TRAZODONE HYDROCHLORIDE 50 MG/1
50 TABLET, FILM COATED ORAL AT BEDTIME
Qty: 30 TABLET | Refills: 1 | Status: SHIPPED | OUTPATIENT
Start: 2022-03-18

## 2022-03-18 RX ADMIN — INDOMETHACIN 25 MG: 25 CAPSULE ORAL at 16:10

## 2022-03-18 RX ADMIN — ALLOPURINOL 100 MG: 100 TABLET ORAL at 08:29

## 2022-03-18 RX ADMIN — BUPROPION HYDROCHLORIDE 150 MG: 150 TABLET, EXTENDED RELEASE ORAL at 08:50

## 2022-03-18 RX ADMIN — THIAMINE HCL TAB 100 MG 100 MG: 100 TAB at 08:29

## 2022-03-18 RX ADMIN — IBUPROFEN 600 MG: 600 TABLET ORAL at 08:50

## 2022-03-18 RX ADMIN — ALLOPURINOL 200 MG: 100 TABLET ORAL at 12:36

## 2022-03-18 RX ADMIN — CLONIDINE HYDROCHLORIDE 0.1 MG: 0.1 TABLET ORAL at 08:29

## 2022-03-18 RX ADMIN — FOLIC ACID 1 MG: 1 TABLET ORAL at 08:29

## 2022-03-18 RX ADMIN — MULTIPLE VITAMINS W/ MINERALS TAB 1 TABLET: TAB at 08:29

## 2022-03-18 RX ADMIN — Medication 25 MG: at 08:29

## 2022-03-18 RX ADMIN — LORAZEPAM 0.5 MG: 0.5 TABLET ORAL at 16:13

## 2022-03-18 NOTE — DISCHARGE INSTRUCTIONS
Behavioral Discharge Planning and Instructions    Summary: You were admitted on 3/14/2022 due to Suicidal Ideations and Chemical Use Issues. You were treated by Hang Rubio MD and discharged on 3/18/2022 from [facility] to Hotel or with Friends.    While you were admitted, you completed a CD assessment. Please consider the following programs for CD treatment:  IOP with sober housing (would need to confirm that evening programming is offered):  [address] [facility] - MICD  Phone: [phone]   Fax: [phone]    [address] [facility] [address]    [address] [place] [address] - MICD  Phone: [phone]   Fax: [phone]    [address] [facility] [address]    [facility] - MICD  Phone: [phone]   Fax: [phone]  [address] [facility] [address]  www.[email or web address].org  (Would need to set up own sober housing and pay difference of cost.)    [facility] - MICD  Phone: [phone]  Fax: [phone]    [facility] - MICD  Phone: [phone]  Fax: [phone]   [address] [facility] [address]          [place] [address]        [facility] - MICD  Phone: [phone]  Fax: [phone]  [address] [facility]  [place] [address]  [address]  [place] [address]    Residential options:  [facility] - MICD  [address] [place] [address]  Phone: [phone]  Fax: [phone]    [facility] - MICD  Phone: [phone]  [address] [place] [address]  Phone: [phone]  Fax: [phone]    [facility] - MICD  Phone: [phone]   Fax: [phone]    [facility] - MICD (their outpatient offers DBT and other MH options as well)  Ph: [phone]  Fax: [phone] [address] [place] [address]    [facility]  Phone: [phone]  Fax: [phone]  email: [email or web address]    [address] [address]   Phone:  [phone]   Fax: [phone]    You may choose to consider the following options for DBT:   1. [facility] ([facility] [place]) - [phone]  2. [facility] ([place]) - [phone]  3. [facility] ([place]) - [phone]   4. [employer] [facility] ([place]) - [phone] (you left a voicemail for Nae Weinberg on 3/16/22)  5. [facility] [employer] (Various Locations) - [phone]  6. [facility] ([place]) - [phone]  7. [facility] ([place]) - [phone]  8. [facility] [employer] ([place]) - [phone]  9. [facility] (Various Locations/Virtual) - [phone]    Additional DBT providers can be found by searching: https://[place].gov/dhs/partners-and-providers/policies-procedures/adult-mental-health/dialectical-behavior-therapy/dbt-certified-providers/    Call [facility] (phone: [phone]) for more information about your CPS Case.     Main Diagnosis:   Bipolar 1 disorder, current episode mixed, severe.  Alcohol use disorder, moderate with alcohol withdrawal.  Unspecified cannabis-related disorder.    Health Care Follow-up:   You indicated that you will call to schedule a psychiatry/medication management appointment.     Attend all scheduled appointments with your outpatient providers. Call at least 24 hours in advance if you need to reschedule an appointment to ensure continued access to your outpatient providers.     Major Treatments, Procedures and Findings:  You were provided with: a psychiatric assessment, assessed for medical stability, medication evaluation and/or management, group therapy, CD evaluation/assessment, milieu management and medical interventions    Symptoms to Report: Feeling more aggressive, increased confusion, losing more sleep, mood getting worse, or thoughts of suicide.    Early warning signs can include:  "Increased depression or anxiety sleep disturbances increased thoughts or behaviors of suicide or self-harm  increased unusual thinking, such as paranoia or hearing voices.    Safety and Wellness: Take all medicines as directed. Make no changes unless your doctor suggests them. Follow treatment recommendations. Refrain from alcohol and non-prescribed drugs. Ask your support system to help you reduce your access to items that could harm yourself or others. If there is a concern for safety, call 911.    Resources:   Mental Health Resources:   -National [place] on Mental Illness (DILIP) [place]: Connect for help, to navigate the mental health system, and for support and for resources. Call: 2-822-XBBS-Helps / 2-112-948-4227  -Crisis Text Line: The 24/7 emergency service is available if you or someone you know is experiencing a psychiatric or mental health crisis. Text: \"[place]\" to 272407  -[place] Warmline: Are you an adult needing support? Talk to a specialist who has firsthand experience living with a mental health condition. Call: 738.184.3323  Text: \"Support\" to [address]   [facility].org/support/[place]-warmline/  -National Suicide Prevention Lifeline: The 24/7 lifeline provides support when in distress, has prevention and crisis resources for you or your loved ones, and resources for professionals.  Call 6-076-541-TALK (7432)  -Peer Support Connection Warmlines: Ghsc-ot-fdkb telephone support that s safe and supportive. Open 5 p.m. to 9 a.m. Call or text: 1-625.637.3527   -COVID Cares Stress Phone Support Service. Any [place] experiencing stress can call 987-HTLB7ZL (690-489-8349) for free telephone support from 9am to 9pm every day. The service is a collaboration with volunteers from the [place] Psychiatric Society, the [place] Psychological Association, the [place] Black Psychologists, and Mental Health [place]. The free service is also accessible at worldhistoryproject.[employer] where searchers can also find " psychiatric and mental health services availability and real-time Substance Use Disorder Treatment program   openings.  -[facility] Crisis (COPE) Response - Adult 587 191-9634    Outpatient Therapy Resources: (many of these clinics/organizations also offer outpatient psychiatry and medication management services - call to inquire)  -[facility] -  [phone]  https://www.[employer]/care/specialty/mental-behavioral-health/  https://www.[employer]/care/find/doctors/psychologists/  -[facility] - https://www.[facility].org/treatments/Counseling-Adult  -[facility] - https://mnCHRISTUS St. [facility] Physicians [facility] CenterAg[facility] Biotherapeutics/  -[facility] - ([phone])  https://[facility] SystemThin Profile Technologies/  -[facility] - (1-943.198.7951) https://www.[employer] (PTG)/  -[facility] - https://www.[employer]/  -[facility] - https://[employer]/  -[facility] - https://walkin.org/    Crisis Beds: (call for availability)  [facility] - [address] [place] [address] (phone: [phone])  [facility] - [address] [place] [address] (phone: [phone])  Crisis Residence - [address] [place] [address] (phone: [phone])    Housing:   Housing Benefits 101 - https://[place].hb101.org/   [facility] [employer] - https://[employer]wing[employer]./waypoint/  [place] Department of Human Services - https://[place].gov/dhs/    General Medication Instructions:     See your medication sheet(s) for instructions.     Take all medicines as directed.  Make no changes unless your doctor suggests them.     Go to all your doctor visits.    Be sure to have all your required lab tests. This way, your medicines can be refilled on time.    Do not use any drugs not prescribed by your doctor.    Avoid alcohol.    Advance Directives:   Scanned document on file with [place]? No scanned doc  Is  document scanned? No. Copy Requested.  Honoring Choices Your Rights Handout: Informed and given  Was more information offered? Pt declined    The Treatment team has appreciated the opportunity to work with you. If you have any questions or concerns about your recent admission, you can contact the unit which can receive your call 24 hours a day, 7 days a week. They will be able to get in touch with a Provider if needed. The unit number is [phone].

## 2022-03-18 NOTE — PLAN OF CARE
Problem: Behavioral Health Plan of Care  Goal: Optimal Comfort and Wellbeing  Outcome: Ongoing, Progressing  Intervention: Monitor Pain and Promote Comfort  Recent Flowsheet Documentation  Taken 3/18/2022 0849 by Anderson Oneil RN  Pain Management Interventions: medication (see MAR)     Problem: Behavioral Health Plan of Care  Goal: Optimal Comfort and Wellbeing  Intervention: Monitor Pain and Promote Comfort  Recent Flowsheet Documentation  Taken 3/18/2022 0849 by Anderson Oenil RN  Pain Management Interventions: medication (see MAR)     Problem: Behavioral Health Plan of Care  Goal: Optimized Coping Skills in Response to Life Stressors  Outcome: Ongoing, Progressing   Goal Outcome Evaluation:        Patient had affect. Was isolative to self, and had no socialization with peers. Occasionally was out pacing on the hallway and sitting out in the lounge watching TV. Was cooperative during assessment and cares. Did not participate in group activities. Complained of pain on the head rated 5/10, anxiety 4/10, depression 5/10. Denied covid 19 symptoms, SI/HI/SIB/AH/VH, and contracted for safety.  Was given Ibuprofen 600 mg for pain and scheduled medication. Had good appetite. Patient is discharged and will leave the unit when medication arrives at the unit from the pharmacy. Will continue to monitor patient.
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
## 2022-03-18 NOTE — CONSULTS
"3/18/2022    CD consult completed.   During assessment pt discussed wanting mental health treatment. Pt was focused on that psychiatrists only want to push meds, he can't believe there aren't therapists on the mental health unit. Pt kept reporting \"this is the last time I'm doing this\" and if things continue to get bad he might as well as use heroin. Pt reports he has had mental health issues and since 2014 he's only gotten medications and feels he needs an entire psychiatric work up. Pt laughed sarcastically about his current situation throughout interview. Pt was vaguely open to MICD treatment, but unsure as he doesn't know about his job and living situation.   In terms of his alcohol use, it's not a problem until it is a problem. Pt reports he drank heavier in the past, but right now he doesn't see a problem (as he is focused on his mental health). Pt reports he is on REAM, needs to blow into it 3x per day so he cannot drink (though he pointed out he was still drinking while on it and could possibly have a drink in between the times he needs to blow).     I spoke to REFUGIO Fernando, will send over options for pt. Referrals and level of care still TBD.     Recommendations:   1. Abstain from all non-prescribed mood-altering substances  2. Take all medications as prescribed  3. Enter and complete a MICD residential or inpatient treatment program  4. Follow all recommendations upon discharge from treatment. Recommendations may include, but are not limited to: extended treatment, outpatient treatment and/or sober housing.  5. Follow all recommendations of your medical and mental health providers  6. It was recommended the patient have a mental health assessment to rule out having a clinical mental health disorder.  7. Follow all conditions of probation/courts.      Clinical Substantiation: Patient has been unable to maintain abstinence from alcohol while living at his current home environment and is now homeless and lacks " a sober living environment. Patient lacks long-term sober maintenance skills, lacks sober coping skills and lacks a sober peer support network. Patient has dual issues of mental health and substance abuse, in which his mental health issues are exacerbated by his substance use. Patient is currently on probation and current legal concerns.      Referrals/Alternatives:  TBD     RACH consult completed by: DEBBIE Garcia.  Phone Number: [phone]  E-mail Address: [email or web address] Mental Health and Addiction Services Evaluation Department  [address] [place] [address]     *Due to regulation of Title 42 of the Code of Federal Regulations (CFR) Part 2: Confidentiality laws apply to this note and the information wherein.  Thus, this note cannot be copy and pasted into any other health care staff's note nor can it be included in general medical records sent to ANY outside agency without the patient's written consent.
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
## 2022-03-18 NOTE — PLAN OF CARE
Goal Outcome Evaluation:    Plan of Care Reviewed With: patient       Problem: Suicidal Behavior  Goal: Suicidal Behavior is Absent or Managed  Outcome: Adequate for Care Transition    Edward  to discharge this shift.  Edward reports satisfaction with this plan.   Denies suicidal ideation, self-harm thoughts, and homicidal ideation. Denies AH/VH.  Discharge medications and AVS reviewed with pt who reports understanding. Plans to follow-up with outpt providers and appts scheduled on AVS.  Belongings sent with pt. Medications sent with Edward. Being transported by self

## 2022-03-18 NOTE — CONSULTS
"Consult Date: 03/18/2022    HISTORY OF PRESENT ILLNESS:  The patient is a 36-year-old male with history of bipolar disorder, admitted to [facility] on 03/14/2022 for severe psychiatric decompensation.  An Internal Medicine consultation was ordered by Dr. Rubio to assess medical problems including gouty pain.  At this time, Zbigniew admits to ongoing left great toe pain.  States he is followed at the [facility] and was seen in January for left great toe gout pain and was prescribed allopurinol 100 mg daily and indomethacin as needed.  States the pain improved, but did recur over the past few days.  States it is mainly swollen but denies significant erythema.  Would like prior to admission as needed indomethacin available for treatment.  Denies pain elsewhere.  Denies other physical issues at this time.    PAST MEDICAL HISTORY:    1.  Bipolar disorder and other psychiatric history per Dr. Rubio.  2.  History of gout (as above).  3.  History of hypertension, no longer on lisinopril.  4.  Denies history of other chronic medical problems including cardiopulmonary disease and diabetes.    PAST SURGICAL HISTORY:  \"A long time ago\".    ADMISSION MEDICATIONS:  Reviewed and listed in medication reconciliation list.    ALLERGIES:  NO KNOWN DRUG ALLERGIES.    Social History     Socioeconomic History     Marital status: Single     Spouse name: Not on file     Number of children: Not on file     Years of education: Not on file     Highest education level: Not on file   Occupational History     Not on file   Tobacco Use     Smoking status: Current Every Day Smoker     Packs/day: 0.50     Types: Cigarettes     Smokeless tobacco: Never Used   Substance and Sexual Activity     Alcohol use: Yes     Comment: binge drinking     Drug use: Not Currently     Sexual activity: Not on file   Other Topics Concern     Not on file   Social History Narrative     Not on file     Social Determinants of " Health     Financial Resource Strain: Not on file   Food Insecurity: Not on file   Transportation Needs: Not on file   Physical Activity: Not on file   Stress: Not on file   Social Connections: Not on file   Intimate Partner Violence: Not on file   Housing Stability: Not on file        History reviewed. No pertinent family history.     REVIEW OF SYSTEMS:  A 10-point review of systems is negative, except as stated above in history of present illness.    PHYSICAL EXAMINATION:    GENERAL:  Nontoxic-appearing gentleman in no acute distress.  VITAL SIGNS:  Normal.  EXTREMITIES:  Left great toe appears slightly swollen and tender, but without significant erythema.    ROUTINE LABS:  [facility]   Lab 03/16/22  0747      POTASSIUM 5.2   CHLORIDE 105   CO2 29   ANIONGAP 5   GLC 99   BUN 10   CR 1.12   GFRESTIMATED 88   SIDRA 9.5   PROTTOTAL 7.0   ALBUMIN 3.8   BILITOTAL 0.7   ALKPHOS 90   AST 15   ALT 26     CBC  Recent Labs   Lab 03/16/22  0747   WBC 7.7   RBC 5.11   HGB 15.1   HCT 44.0   MCV 86   MCH 29.5   MCHC 34.3   RDW 12.6          ASSESSMENT:  1.  Psychiatric decompensation in patient with hx of bipolar disorder and substance per primary team.   2.  Acute left great toe pain with moderate swelling and tenderness on exam likely due to recurrence of his known gout.  3.  Dyslipidemia on admission labs, question nonfasting.    PLAN:  Will continue with prior to admission allopurinol, however, increase from 100 mg daily to 300 mg daily.  Will also restart previously prescribed indomethacin 25 mg p.o. t.i.d. p.r.n. pain.  No further medical recommendations at this time.  Please instruct patient to follow up with his family physician after discharge within 1-2 weeks for hospital follow up and to obtain repeat fasting lipid panel.  The patient is medically stable and Medicine signing off.  Please feel free to call if questions.    Stephen Wong PA-C  Internal Medicine Hospitalist   [facility]  Health  [phone]         D: 2022   T: 2022   MT: VINITA    Name:     LAURA ZAPATAVlad  MRN:      [id number]        Account:      [id number]   :      1985           Consult Date: 2022     Document: [id number]

## 2022-03-19 ENCOUNTER — PATIENT OUTREACH (OUTPATIENT)
Dept: CARE COORDINATION | Facility: CLINIC | Age: 37
End: 2022-03-19
Payer: COMMERCIAL

## 2022-03-19 DIAGNOSIS — Z71.89 OTHER SPECIFIED COUNSELING: ICD-10-CM
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
## 2022-03-31 ENCOUNTER — TELEPHONE (OUTPATIENT)
Dept: PHARMACY | Facility: CLINIC | Age: 37
End: 2022-03-31
Payer: COMMERCIAL

## 2022-03-31 NOTE — TELEPHONE ENCOUNTER
Pt scheduled for MTM transitions of care telephone appointment today.  Pt did not answer x2 attempts. LVM with MTM scheduling line ([phone]) and encouraged pt to reschedule.